# Patient Record
(demographics unavailable — no encounter records)

---

## 2024-10-29 NOTE — HISTORY OF PRESENT ILLNESS
[de-identified] :  10/29/2024 EDGARD ANDERSON is a 74 year-old male here today for: Location: left ring finger Complaint: The patient presents to the office today for evaluation of his left ring finger tip injury.  He notes that he was splitting logs a few days ago when he caught the tip of his finger.  He was seen in urgent care where his wound was closed.  Since that time he notes mild pain. Symptom onset: 10/24/2024  Prior treatments: Wound care, po abx Hand Dominance: right  Occupation: retired  PMH: denies  Allergies: NKDA

## 2024-10-29 NOTE — DISCUSSION/SUMMARY
[de-identified] : - reviewed the nature of this injury and prognosis with the patient and his wife in layman's terms - reviewed conservative treatment options including the role for wound care, anti-inflammatory medications and therapy - reviewed risks, benefits and alternatives to these - wound care demonstrated - complete po abx - NSAIDs as needed for pain - f/u in 1 week for wound check, sooner if he has any questions or concerns - reviewed signs and symptoms which should prompt repeat urgent evaluation and he voiced understanding  My cumulative time spent on this patient's visit included: Preparation for the visit, review of the medical records, review of pertinent diagnostic studies, examination and counseling of the patient on the above diagnosis, treatment plan and prognosis, orders of diagnostic tests, medications and/or appropriate procedures and documentation in the medical records of today's visit.

## 2024-10-29 NOTE — IMAGING
[de-identified] : Left ring finger 1 cm area of full thickness skin loss healthy granulation tissue present  Well-approximated irregularly-shaped laceration on the volar fingertip, sutures in place No eschar or signs of infection Full finger ROM  +AIN/ PIN/ Ulnar n SILT throughout wwp   3 views left ring finger: No acute fractures or malalignment

## 2024-11-07 NOTE — HISTORY OF PRESENT ILLNESS
[de-identified] : 11/07/2024: The patient returns today for repeat evaluation of his left ring fingertip laceration.  He has been performing wound care as previously instructed and completed oral antibiotics 5 days ago.  He is doing well overall with no new complaints.   10/29/2024 EDGARD ANDERSON is a 74 year-old male here today for: Location: left ring finger Complaint: The patient presents to the office today for evaluation of his left ring finger tip injury.  He notes that he was splitting logs a few days ago when he caught the tip of his finger.  He was seen in urgent care where his wound was closed.  Since that time he notes mild pain. Symptom onset: 10/24/2024  Prior treatments: Wound care, po abx Hand Dominance: right  Occupation: retired  PMH: denies  Allergies: NKDA

## 2024-11-07 NOTE — DISCUSSION/SUMMARY
[de-identified] : - again reviewed the nature of this injury and prognosis with the patient in layman's terms - reviewed conservative treatment options including the role for wound care, anti-inflammatory medications and therapy - reviewed risks, benefits and alternatives to these - continue wound care and dressing changes - NSAIDs as needed for pain - f/u in 1 week for wound check, sooner if he has any questions or concerns - reviewed signs and symptoms which should prompt repeat urgent evaluation and he voiced understanding  My cumulative time spent on this patient's visit included: Preparation for the visit, review of the medical records, review of pertinent diagnostic studies, examination and counseling of the patient on the above diagnosis, treatment plan and prognosis, orders of diagnostic tests, medications and/or appropriate procedures and documentation in the medical records of today's visit.

## 2024-11-07 NOTE — IMAGING
[de-identified] : Left ring finger 1 cm area of full thickness skin loss, healthy granulation tissue present  Well-healed irregularly-shaped laceration on the volar fingertip, sutures in place No eschar or signs of infection Full finger ROM  +AIN/ PIN/ Ulnar n SILT throughout wwp  3 views left ring finger: No acute fractures or malalignment

## 2024-11-19 NOTE — IMAGING
[de-identified] : Left ring finger 5 mm area of full thickness skin loss, healthy granulation tissue present  No scar sensitivity No eschar or signs of infection Normal-appearing nail Full finger ROM  +AIN/ PIN/ Ulnar n SILT throughout wwp  3 views left ring finger: No acute fractures or malalignment

## 2024-11-19 NOTE — HISTORY OF PRESENT ILLNESS
[de-identified] : 11/19/24:  The patient returns today for repeat evaluation of his left ring fingertip laceration.  He has been performing wound care as previously instructed.  He is doing well with no pain or sensitivity today.   10/29/2024 EDGARD ANDERSON is a 74 year-old male here today for: Location: left ring finger Complaint: The patient presents to the office today for evaluation of his left ring finger tip injury.  He notes that he was splitting logs a few days ago when he caught the tip of his finger.  He was seen in urgent care where his wound was closed.  Since that time he notes mild pain. Symptom onset: 10/24/2024  Prior treatments: Wound care, po abx Hand Dominance: right  Occupation: retired  PMH: denies  Allergies: NKDA

## 2024-11-19 NOTE — DISCUSSION/SUMMARY
[de-identified] : - again reviewed the nature of this injury and prognosis with the patient in layman's terms - reviewed conservative treatment options including the role for wound care, anti-inflammatory medications and therapy - reviewed risks, benefits and alternatives to these - continue wound care - NSAIDs as needed for pain - f/u in 2 weeks for wound check, sooner if he has any questions or concerns - reviewed signs and symptoms which should prompt repeat urgent evaluation and he voiced understanding  My cumulative time spent on this patient's visit included: Preparation for the visit, review of the medical records, review of pertinent diagnostic studies, examination and counseling of the patient on the above diagnosis, treatment plan and prognosis, orders of diagnostic tests, medications and/or appropriate procedures and documentation in the medical records of today's visit.